# Patient Record
Sex: FEMALE | Race: WHITE | NOT HISPANIC OR LATINO | URBAN - METROPOLITAN AREA
[De-identification: names, ages, dates, MRNs, and addresses within clinical notes are randomized per-mention and may not be internally consistent; named-entity substitution may affect disease eponyms.]

---

## 2017-09-20 ENCOUNTER — OUTPATIENT (OUTPATIENT)
Dept: OUTPATIENT SERVICES | Facility: HOSPITAL | Age: 27
LOS: 1 days | Discharge: HOME | End: 2017-09-20

## 2017-09-20 DIAGNOSIS — Z11.3 ENCOUNTER FOR SCREENING FOR INFECTIONS WITH A PREDOMINANTLY SEXUAL MODE OF TRANSMISSION: ICD-10-CM

## 2017-09-20 DIAGNOSIS — O99.89 OTHER SPECIFIED DISEASES AND CONDITIONS COMPLICATING PREGNANCY, CHILDBIRTH AND THE PUERPERIUM: ICD-10-CM

## 2017-09-20 DIAGNOSIS — Z01.419 ENCOUNTER FOR GYNECOLOGICAL EXAMINATION (GENERAL) (ROUTINE) WITHOUT ABNORMAL FINDINGS: ICD-10-CM

## 2017-11-21 PROBLEM — Z00.00 ENCOUNTER FOR PREVENTIVE HEALTH EXAMINATION: Status: ACTIVE | Noted: 2017-11-21

## 2019-07-30 ENCOUNTER — OUTPATIENT (OUTPATIENT)
Dept: OUTPATIENT SERVICES | Facility: HOSPITAL | Age: 29
LOS: 1 days | Discharge: HOME | End: 2019-07-30

## 2019-07-30 ENCOUNTER — APPOINTMENT (OUTPATIENT)
Dept: ANTEPARTUM | Facility: CLINIC | Age: 29
End: 2019-07-30
Payer: COMMERCIAL

## 2019-07-30 PROCEDURE — 76805 OB US >/= 14 WKS SNGL FETUS: CPT | Mod: 26

## 2019-08-02 DIAGNOSIS — Z36.89 ENCOUNTER FOR OTHER SPECIFIED ANTENATAL SCREENING: ICD-10-CM

## 2019-08-02 DIAGNOSIS — Z3A.21 21 WEEKS GESTATION OF PREGNANCY: ICD-10-CM

## 2019-12-08 ENCOUNTER — OUTPATIENT (OUTPATIENT)
Dept: OUTPATIENT SERVICES | Facility: HOSPITAL | Age: 29
LOS: 1 days | Discharge: HOME | End: 2019-12-08

## 2019-12-08 VITALS
HEART RATE: 112 BPM | RESPIRATION RATE: 18 BRPM | SYSTOLIC BLOOD PRESSURE: 136 MMHG | TEMPERATURE: 98 F | DIASTOLIC BLOOD PRESSURE: 86 MMHG

## 2019-12-08 VITALS — SYSTOLIC BLOOD PRESSURE: 136 MMHG | DIASTOLIC BLOOD PRESSURE: 86 MMHG | HEART RATE: 112 BPM

## 2019-12-08 NOTE — OB PROVIDER TRIAGE NOTE - NSHPPHYSICALEXAM_GEN_ALL_CORE
Vital Signs Last 24 Hrs  T(C): 36.7 (08 Dec 2019 10:28), Max: 36.7 (08 Dec 2019 10:28)  T(F): 98.1 (08 Dec 2019 10:28), Max: 98.1 (08 Dec 2019 10:28)  HR: 112 (08 Dec 2019 10:28) (112 - 112) Manual bedside pulse 96 bpm  BP: 136/86 (08 Dec 2019 10:28) (136/86 - 136/86)  RR: 18 (08 Dec 2019 10:28) (18 - 18)    Gen: NAD, sitting comfortably  Abd: Gravid, soft, NT, palpable ctx  SVE: 3/80/-2, vtx, intact  EFM: 135/mod/+accels, cat I  [place]: irregular  Sono: BPP 8/8, ceph, post placenta, MVP 4.06cm

## 2019-12-08 NOTE — OB PROVIDER TRIAGE NOTE - FAMILY HISTORY
Mother  Still living? Unknown  Family history of breast cancer in mother, Age at diagnosis: Age Unknown  Family history of ovarian cancer, Age at diagnosis: Age Unknown     Aunt  Still living? Unknown  Family history of breast cancer in mother, Age at diagnosis: Age Unknown

## 2019-12-08 NOTE — OB PROVIDER TRIAGE NOTE - NSHPLABSRESULTS_GEN_ALL_CORE
Labs:  4/30  HBsAg NR  Measles nonimmune   varicella immune  rubella immune  mumps immune  A pos, antibody positive, anti-E, too weak to titrate  GBS bacteruria  lead <1  HIV NR    9/6    Antibody pos, Anti-E and anti-c, too weak to titrate     10/29  RPR NR  HIV NR  antibody pos, anti-c, anti-E, titer 1    11/12  GBS pos    Sono:  5/31: +FH, CRL 13.1w  7/30: post placenta, normal anatomy AGA

## 2019-12-08 NOTE — OB PROVIDER TRIAGE NOTE - HISTORY OF PRESENT ILLNESS
28yo  @40w3d with AASHISH 12/ by LMP  here for contractions for the last 2 days, they were q5m last night, now very irregular, not in pain.  Denies VB, LOF.  Good FM.  Denies any complications with this pregnancy.  GBS bacteruria.

## 2019-12-08 NOTE — OB RN TRIAGE NOTE - NS_VISITREASON1_OBGYN_ALL_OB
----- Message from Cece Cunningham NP sent at 5/19/2019  9:17 AM CDT -----  Please inform patient of negative throat culture  
Left call back msg  
Labor at Term

## 2019-12-08 NOTE — OB PROVIDER TRIAGE NOTE - NSOBPROVIDERNOTE_OBGYN_ALL_OB_FT
30yo  @40w3d, GBS pos, antibody anti-E and anti-C positive, BRCA2 pos, measles nonimmune, BPP 8/8, maternal and fetal wellbeing reassuring, in latent labor  -discharge home, labor precautions discussed  -follow up at scheduled appointment tomorrow  -PO hydration encouraged  -tylenol prn for pain    Dr. Hebert and Dr. Hernandez aware.

## 2019-12-08 NOTE — OB PROVIDER TRIAGE NOTE - NS_OBGYNHISTORY_OBGYN_ALL_OB_FT
Ob:  x3, largest 7-3, G1 induced at 42 weeks for oligohydramnios    Gyn: hx of BRCA2 positive, follows at [facility].  Denies fibroids, cysts, abnormal pap smears, STDs.

## 2019-12-08 NOTE — OB PROVIDER TRIAGE NOTE - ADDITIONAL INSTRUCTIONS
If you notice bright red blood enough to soak a pad, a large gush of fluid or continuous leakage of fluid, or decreased fetal movement please come to labor and delivery.  Fetal kick counts should be monitored, and 5-6 kicks should be felt per hour.  If less, please call your doctor.  Please follow up at your regularly scheduled prenatal appointment.

## 2019-12-08 NOTE — OB RN TRIAGE NOTE - FAMILY HISTORY
Mother  Still living? Unknown  Family history of breast cancer in mother, Age at diagnosis: Age Unknown     Aunt  Still living? Unknown  Family history of breast cancer in mother, Age at diagnosis: Age Unknown

## 2019-12-09 ENCOUNTER — INPATIENT (INPATIENT)
Facility: HOSPITAL | Age: 29
LOS: 1 days | Discharge: HOME | End: 2019-12-11
Attending: OBSTETRICS & GYNECOLOGY | Admitting: OBSTETRICS & GYNECOLOGY
Payer: COMMERCIAL

## 2019-12-09 VITALS
HEIGHT: 67 IN | RESPIRATION RATE: 18 BRPM | WEIGHT: 214.95 LBS | DIASTOLIC BLOOD PRESSURE: 78 MMHG | HEART RATE: 75 BPM | TEMPERATURE: 98 F | SYSTOLIC BLOOD PRESSURE: 133 MMHG

## 2019-12-09 PROBLEM — Z78.9 OTHER SPECIFIED HEALTH STATUS: Chronic | Status: ACTIVE | Noted: 2019-12-08

## 2019-12-09 LAB
ALLERGY+IMMUNOLOGY DIAG STUDY NOTE: SIGNIFICANT CHANGE UP
AMPHET UR-MCNC: NEGATIVE — SIGNIFICANT CHANGE UP
ANTIBODY INTERPRETATION 2: SIGNIFICANT CHANGE UP
APPEARANCE UR: ABNORMAL
BACTERIA # UR AUTO: NEGATIVE — SIGNIFICANT CHANGE UP
BARBITURATES UR SCN-MCNC: NEGATIVE — SIGNIFICANT CHANGE UP
BASOPHILS # BLD AUTO: 0.01 K/UL — SIGNIFICANT CHANGE UP (ref 0–0.2)
BASOPHILS NFR BLD AUTO: 0.1 % — SIGNIFICANT CHANGE UP (ref 0–1)
BENZODIAZ UR-MCNC: NEGATIVE — SIGNIFICANT CHANGE UP
BILIRUB UR-MCNC: NEGATIVE — SIGNIFICANT CHANGE UP
BLD GP AB SCN SERPL QL: SIGNIFICANT CHANGE UP
BUPRENORPHINE SCREEN, URINE RESULT: NEGATIVE — SIGNIFICANT CHANGE UP
COCAINE METAB.OTHER UR-MCNC: NEGATIVE — SIGNIFICANT CHANGE UP
COLOR SPEC: YELLOW — SIGNIFICANT CHANGE UP
DIFF PNL FLD: NEGATIVE — SIGNIFICANT CHANGE UP
DIR ANTIGLOB POLYSPECIFIC INTERPRETATION: SIGNIFICANT CHANGE UP
EOSINOPHIL # BLD AUTO: 0 K/UL — SIGNIFICANT CHANGE UP (ref 0–0.7)
EOSINOPHIL NFR BLD AUTO: 0 % — SIGNIFICANT CHANGE UP (ref 0–8)
EPI CELLS # UR: 4 /HPF — SIGNIFICANT CHANGE UP (ref 0–5)
FENTANYL UR QL: NEGATIVE — SIGNIFICANT CHANGE UP
GLUCOSE UR QL: NEGATIVE — SIGNIFICANT CHANGE UP
HCT VFR BLD CALC: 37.4 % — SIGNIFICANT CHANGE UP (ref 37–47)
HGB BLD-MCNC: 12.7 G/DL — SIGNIFICANT CHANGE UP (ref 12–16)
HYALINE CASTS # UR AUTO: 3 /LPF — SIGNIFICANT CHANGE UP (ref 0–7)
IMM GRANULOCYTES NFR BLD AUTO: 0.3 % — SIGNIFICANT CHANGE UP (ref 0.1–0.3)
KETONES UR-MCNC: NEGATIVE — SIGNIFICANT CHANGE UP
L&D DRUG SCREEN, URINE: SIGNIFICANT CHANGE UP
LEUKOCYTE ESTERASE UR-ACNC: NEGATIVE — SIGNIFICANT CHANGE UP
LYMPHOCYTES # BLD AUTO: 0.93 K/UL — LOW (ref 1.2–3.4)
LYMPHOCYTES # BLD AUTO: 7.9 % — LOW (ref 20.5–51.1)
MCHC RBC-ENTMCNC: 27.9 PG — SIGNIFICANT CHANGE UP (ref 27–31)
MCHC RBC-ENTMCNC: 34 G/DL — SIGNIFICANT CHANGE UP (ref 32–37)
MCV RBC AUTO: 82 FL — SIGNIFICANT CHANGE UP (ref 81–99)
METHADONE UR-MCNC: NEGATIVE — SIGNIFICANT CHANGE UP
MONOCYTES # BLD AUTO: 0.45 K/UL — SIGNIFICANT CHANGE UP (ref 0.1–0.6)
MONOCYTES NFR BLD AUTO: 3.8 % — SIGNIFICANT CHANGE UP (ref 1.7–9.3)
NEUTROPHILS # BLD AUTO: 10.3 K/UL — HIGH (ref 1.4–6.5)
NEUTROPHILS NFR BLD AUTO: 87.9 % — HIGH (ref 42.2–75.2)
NITRITE UR-MCNC: NEGATIVE — SIGNIFICANT CHANGE UP
NRBC # BLD: 0 /100 WBCS — SIGNIFICANT CHANGE UP (ref 0–0)
OPIATES UR-MCNC: NEGATIVE — SIGNIFICANT CHANGE UP
OXYCODONE UR-MCNC: NEGATIVE — SIGNIFICANT CHANGE UP
PCP UR-MCNC: NEGATIVE — SIGNIFICANT CHANGE UP
PH UR: 6.5 — SIGNIFICANT CHANGE UP (ref 5–8)
PLATELET # BLD AUTO: 201 K/UL — SIGNIFICANT CHANGE UP (ref 130–400)
PRENATAL SYPHILIS TEST: SIGNIFICANT CHANGE UP
PROPOXYPHENE QUALITATIVE URINE RESULT: NEGATIVE — SIGNIFICANT CHANGE UP
PROT UR-MCNC: ABNORMAL
RBC # BLD: 4.56 M/UL — SIGNIFICANT CHANGE UP (ref 4.2–5.4)
RBC # FLD: 13.6 % — SIGNIFICANT CHANGE UP (ref 11.5–14.5)
RBC CASTS # UR COMP ASSIST: 9 /HPF — HIGH (ref 0–4)
SP GR SPEC: 1.02 — SIGNIFICANT CHANGE UP (ref 1.01–1.02)
UROBILINOGEN FLD QL: SIGNIFICANT CHANGE UP
WBC # BLD: 11.72 K/UL — HIGH (ref 4.8–10.8)
WBC # FLD AUTO: 11.72 K/UL — HIGH (ref 4.8–10.8)
WBC UR QL: 2 /HPF — SIGNIFICANT CHANGE UP (ref 0–5)

## 2019-12-09 PROCEDURE — 59400 OBSTETRICAL CARE: CPT

## 2019-12-09 RX ORDER — DIBUCAINE 1 %
1 OINTMENT (GRAM) RECTAL EVERY 6 HOURS
Refills: 0 | Status: DISCONTINUED | OUTPATIENT
Start: 2019-12-09 | End: 2019-12-11

## 2019-12-09 RX ORDER — SIMETHICONE 80 MG/1
80 TABLET, CHEWABLE ORAL EVERY 4 HOURS
Refills: 0 | Status: DISCONTINUED | OUTPATIENT
Start: 2019-12-09 | End: 2019-12-11

## 2019-12-09 RX ORDER — IBUPROFEN 200 MG
600 TABLET ORAL EVERY 6 HOURS
Refills: 0 | Status: COMPLETED | OUTPATIENT
Start: 2019-12-09 | End: 2020-11-06

## 2019-12-09 RX ORDER — DEXAMETHASONE 0.5 MG/5ML
4 ELIXIR ORAL EVERY 6 HOURS
Refills: 0 | Status: DISCONTINUED | OUTPATIENT
Start: 2019-12-09 | End: 2019-12-11

## 2019-12-09 RX ORDER — OXYTOCIN 10 UNIT/ML
333.33 VIAL (ML) INJECTION
Qty: 20 | Refills: 0 | Status: DISCONTINUED | OUTPATIENT
Start: 2019-12-09 | End: 2019-12-11

## 2019-12-09 RX ORDER — ACETAMINOPHEN 500 MG
975 TABLET ORAL
Refills: 0 | Status: DISCONTINUED | OUTPATIENT
Start: 2019-12-09 | End: 2019-12-11

## 2019-12-09 RX ORDER — NALOXONE HYDROCHLORIDE 4 MG/.1ML
0.1 SPRAY NASAL
Refills: 0 | Status: DISCONTINUED | OUTPATIENT
Start: 2019-12-09 | End: 2019-12-11

## 2019-12-09 RX ORDER — DIPHENHYDRAMINE HCL 50 MG
25 CAPSULE ORAL EVERY 6 HOURS
Refills: 0 | Status: DISCONTINUED | OUTPATIENT
Start: 2019-12-09 | End: 2019-12-11

## 2019-12-09 RX ORDER — HYDROCORTISONE 1 %
1 OINTMENT (GRAM) TOPICAL EVERY 6 HOURS
Refills: 0 | Status: DISCONTINUED | OUTPATIENT
Start: 2019-12-09 | End: 2019-12-11

## 2019-12-09 RX ORDER — PRAMOXINE HYDROCHLORIDE 150 MG/15G
1 AEROSOL, FOAM RECTAL EVERY 4 HOURS
Refills: 0 | Status: DISCONTINUED | OUTPATIENT
Start: 2019-12-09 | End: 2019-12-11

## 2019-12-09 RX ORDER — SODIUM CHLORIDE 9 MG/ML
1000 INJECTION, SOLUTION INTRAVENOUS
Refills: 0 | Status: DISCONTINUED | OUTPATIENT
Start: 2019-12-09 | End: 2019-12-09

## 2019-12-09 RX ORDER — LANOLIN
1 OINTMENT (GRAM) TOPICAL EVERY 6 HOURS
Refills: 0 | Status: DISCONTINUED | OUTPATIENT
Start: 2019-12-09 | End: 2019-12-11

## 2019-12-09 RX ORDER — MAGNESIUM HYDROXIDE 400 MG/1
30 TABLET, CHEWABLE ORAL
Refills: 0 | Status: DISCONTINUED | OUTPATIENT
Start: 2019-12-09 | End: 2019-12-11

## 2019-12-09 RX ORDER — IBUPROFEN 200 MG
600 TABLET ORAL EVERY 6 HOURS
Refills: 0 | Status: DISCONTINUED | OUTPATIENT
Start: 2019-12-09 | End: 2019-12-11

## 2019-12-09 RX ORDER — GLYCERIN ADULT
1 SUPPOSITORY, RECTAL RECTAL AT BEDTIME
Refills: 0 | Status: DISCONTINUED | OUTPATIENT
Start: 2019-12-09 | End: 2019-12-11

## 2019-12-09 RX ORDER — KETOROLAC TROMETHAMINE 30 MG/ML
30 SYRINGE (ML) INJECTION ONCE
Refills: 0 | Status: DISCONTINUED | OUTPATIENT
Start: 2019-12-09 | End: 2019-12-09

## 2019-12-09 RX ORDER — AMPICILLIN TRIHYDRATE 250 MG
2 CAPSULE ORAL ONCE
Refills: 0 | Status: COMPLETED | OUTPATIENT
Start: 2019-12-09 | End: 2019-12-09

## 2019-12-09 RX ORDER — SODIUM CHLORIDE 9 MG/ML
3 INJECTION INTRAMUSCULAR; INTRAVENOUS; SUBCUTANEOUS EVERY 8 HOURS
Refills: 0 | Status: DISCONTINUED | OUTPATIENT
Start: 2019-12-09 | End: 2019-12-11

## 2019-12-09 RX ORDER — OXYCODONE HYDROCHLORIDE 5 MG/1
5 TABLET ORAL ONCE
Refills: 0 | Status: DISCONTINUED | OUTPATIENT
Start: 2019-12-09 | End: 2019-12-11

## 2019-12-09 RX ORDER — AMPICILLIN TRIHYDRATE 250 MG
1 CAPSULE ORAL EVERY 4 HOURS
Refills: 0 | Status: DISCONTINUED | OUTPATIENT
Start: 2019-12-09 | End: 2019-12-09

## 2019-12-09 RX ORDER — AER TRAVELER 0.5 G/1
1 SOLUTION RECTAL; TOPICAL EVERY 4 HOURS
Refills: 0 | Status: DISCONTINUED | OUTPATIENT
Start: 2019-12-09 | End: 2019-12-11

## 2019-12-09 RX ORDER — OXYCODONE HYDROCHLORIDE 5 MG/1
5 TABLET ORAL
Refills: 0 | Status: DISCONTINUED | OUTPATIENT
Start: 2019-12-09 | End: 2019-12-11

## 2019-12-09 RX ORDER — ONDANSETRON 8 MG/1
4 TABLET, FILM COATED ORAL EVERY 6 HOURS
Refills: 0 | Status: DISCONTINUED | OUTPATIENT
Start: 2019-12-09 | End: 2019-12-11

## 2019-12-09 RX ORDER — BENZOCAINE 10 %
1 GEL (GRAM) MUCOUS MEMBRANE EVERY 6 HOURS
Refills: 0 | Status: DISCONTINUED | OUTPATIENT
Start: 2019-12-09 | End: 2019-12-11

## 2019-12-09 RX ADMIN — SODIUM CHLORIDE 3 MILLILITER(S): 9 INJECTION INTRAMUSCULAR; INTRAVENOUS; SUBCUTANEOUS at 15:02

## 2019-12-09 RX ADMIN — SODIUM CHLORIDE 3 MILLILITER(S): 9 INJECTION INTRAMUSCULAR; INTRAVENOUS; SUBCUTANEOUS at 23:35

## 2019-12-09 RX ADMIN — Medication 975 MILLIGRAM(S): at 15:03

## 2019-12-09 RX ADMIN — Medication 216 GRAM(S): at 09:25

## 2019-12-09 RX ADMIN — Medication 600 MILLIGRAM(S): at 23:34

## 2019-12-09 RX ADMIN — Medication 30 MILLIGRAM(S): at 12:31

## 2019-12-09 RX ADMIN — Medication 600 MILLIGRAM(S): at 19:29

## 2019-12-09 NOTE — PROGRESS NOTE ADULT - ASSESSMENT
A/P:  28yo  @40w4d, GBS pos, antibody anti-E and anti-C positive, BRCA2 pos, measles nonimmune, in labor  -cont EFM/toco  -clear liquid diet, IVF  -pain management with epidural  -continue ampicillin for GBS prophylaxis  -f/u UDS  -MMR postpartum    Dr. Patel and Dr. Hoffman aware. A/P:  28yo  @40w4d, GBS pos, antibody anti-E and anti-C positive, BRCA2 pos, measles nonimmune, in labor, s/p epidural, s/p SROM   -cont EFM/toco  -clear liquid diet, IVF  -pain management with epidural  -continue ampicillin for GBS prophylaxis  -f/u UDS  -MMR postpartum    Dr. Patel and Dr. Hoffman aware.

## 2019-12-09 NOTE — OB RN DELIVERY SUMMARY - NS_SEPSISRSKCALC_OBGYN_ALL_OB_FT
EOS calculated successfully. EOS Risk Factor: 0.5/1000 live births ([facility] national incidence); GA=40w4d; Temp=98.4; ROM=1; GBS='Positive'; Antibiotics='Broad spectrum antibiotics 2-3.9 hrs prior to birth'

## 2019-12-09 NOTE — OB PROVIDER H&P - NS_OBGYNHISTORY_OBGYN_ALL_OB_FT
Ob:  x3, largest 7-3, G1 induced at 42 weeks for oligohydramnios  SAB x 1, no D&C    Gyn: hx of BRCA2 positive, follows at [facility].  Denies fibroids, cysts, abnormal pap smears, STDs.

## 2019-12-09 NOTE — OB PROVIDER H&P - ATTENDING COMMENTS
30yo  @40w4d, GBS pos, antibody anti-E and anti-C positive, BRCA2 pos, measles nonimmune, in labor  -admit  -iv access  -iv abx for gbs prophylaxis   -MMR at d/c   -for epidural analgesia

## 2019-12-09 NOTE — OB PROVIDER DELIVERY SUMMARY - NSPROVIDERDELIVERYNOTE_OBGYN_ALL_OB_FT
Patient was fully dilated and pushing. Fetal head was OP and patient began pushing. Became exhausted. Vacuum placed. Three pulls no pop offs, max pressure 500 mmhg. The head delivered.  The anterior and posterior shoulders delivered, followed by the remaining body atraumatically. Body cord and nuchal arm present. Delayed cord clamping was performed, and then clamped and cut. Cord blood gases collected x2. The  was handed to the mother. The placenta delivered intact with membranes. Pitocin was administered. Uterus massaged, fundus found to be firm. Cervix, vagina and perineum inspected.First degree laceration was noted, repaired using 3-0 chromic in the usual fashion with good hemostasis.     Viable male infant delivered, weighing 3480 gms, with APGARs 9/9    Laceration: first degree laceration  [id number] cc

## 2019-12-09 NOTE — PROCEDURE NOTE - NSSITEPREP_SKIN_A_CORE
Adherence to aseptic technique: hand hygiene prior to donning barriers (gown, gloves), don cap and mask, sterile drape over patient/prep

## 2019-12-09 NOTE — OB PROVIDER H&P - ASSESSMENT
30yo  @40w4d, GBS pos, antibody anti-E and anti-C positive, BRCA2 pos, measles nonimmune, in labor  -admit to L&D  -cont efm/toco  -clear liquid diet, IVF  -monitor vitals per routine  -f/u admission labs  -MMR postpartum  -ampicillin for GBS prophlaxis    Dr. Hebert and Dr. Hoffman aware.

## 2019-12-09 NOTE — OB PROVIDER H&P - NSHPPHYSICALEXAM_GEN_ALL_CORE
ICU Vital Signs Last 24 Hrs  T(C): 36.7 (08 Dec 2019 10:28), Max: 36.7 (08 Dec 2019 10:28)  T(F): 98.1 (08 Dec 2019 10:28), Max: 98.1 (08 Dec 2019 10:28)  HR: 75 (09 Dec 2019 09:25) (75 - 112)  BP: 133/78 (09 Dec 2019 09:25) (133/78 - 136/86)  RR: 18 (08 Dec 2019 10:28) (18 - 18)    Gen: NAD, sitting comfortably  Abd: Gravid, soft, NT, strongly palpable ctx  SVE: 8/100/-2, vtx, intact  EFM: 140/mod/+accels, cat I  [place]: q5m ICU Vital Signs Last 24 Hrs  T(C): 36.7 (08 Dec 2019 10:28), Max: 36.7 (08 Dec 2019 10:28)  T(F): 98.1 (08 Dec 2019 10:28), Max: 98.1 (08 Dec 2019 10:28)  HR: 75 (09 Dec 2019 09:25) (75 - 112)  BP: 133/78 (09 Dec 2019 09:25) (133/78 - 136/86)  RR: 18 (08 Dec 2019 10:28) (18 - 18)    Gen: NAD, sitting comfortably  Abd: Gravid, soft, NT, strongly palpable ctx  SVE: 8/90/-1, vtx, intact  EFM: 140/mod/+accels, cat I  [place]: q5m

## 2019-12-09 NOTE — OB PROVIDER H&P - HISTORY OF PRESENT ILLNESS
28yo  @40w4d with AASHISH 12/5 by LMP  here for contractions for the last 2 days, they were q2-3m last night, desires pain management with epidural.  Reports scant mucous discharge, no robert LOF.  Denies VB. Good FM.  H/o BRCA2 positive, follows at [facility].  Denies any complications with this pregnancy.  GBS bacteruria.

## 2019-12-09 NOTE — PROCEDURE NOTE - NSINFORMCONSENT_GEN_A_CORE
me/Benefits, risks, and possible complications of procedure explained to patient/caregiver who verbalized understanding and gave written consent.

## 2019-12-09 NOTE — PROGRESS NOTE ADULT - SUBJECTIVE AND OBJECTIVE BOX
PGY 1 Note    Patient seen at bedside for evaluation of labor progression.  Comfortable s/p epidural.  Currently pushing. With intermittent variable decels, resolving spontaneously.     EFM: 155/mod/+accel/+intermittent variable decels  TOCO: q2-3m  SVE: 10100/0 @1119    Labs:                        12.7   11.72 )-----------( 201      ( 09 Dec 2019 09:09 )             37.4           ABO RH Interpretation: A POS (19 @ 09:09)  antibody pos, Anti-C and Anti-E, polyspecific interpretation neg    Urinalysis Basic - ( 09 Dec 2019 09:39 )    Color: Yellow / Appearance: Slightly Turbid / S.023 / pH: x  Gluc: x / Ketone: Negative  / Bili: Negative / Urobili: <2 mg/dL   Blood: x / Protein: 30 mg/dL / Nitrite: Negative   Leuk Esterase: Negative / RBC: 9 /HPF / WBC 2 /HPF   Sq Epi: x / Non Sq Epi: 4 /HPF / Bacteria: Negative          Meds: ampicillin  IVPB 1 Gram(s) IV Intermittent every 4 hours, started @0984 PGY 1 Note    Patient seen at bedside for evaluation of labor progression.  Comfortable s/p epidural.  S/p SROM clear @1058. Currently pushing. With intermittent variable decels, resolving spontaneously.     EFM: 155/mod/+accel/+intermittent variable decels  TOCO: q2-3m  SVE: 10/100/0 @1119    Labs:                        12.7   11.72 )-----------( 201      ( 09 Dec 2019 09:09 )             37.4           ABO RH Interpretation: A POS (19 @ 09:09)  antibody pos, Anti-C and Anti-E, polyspecific interpretation neg    Urinalysis Basic - ( 09 Dec 2019 09:39 )    Color: Yellow / Appearance: Slightly Turbid / S.023 / pH: x  Gluc: x / Ketone: Negative  / Bili: Negative / Urobili: <2 mg/dL   Blood: x / Protein: 30 mg/dL / Nitrite: Negative   Leuk Esterase: Negative / RBC: 9 /HPF / WBC 2 /HPF   Sq Epi: x / Non Sq Epi: 4 /HPF / Bacteria: Negative          Meds: ampicillin  IVPB 1 Gram(s) IV Intermittent every 4 hours, started @0925  epidural @0992

## 2019-12-10 LAB
HCT VFR BLD CALC: 32.5 % — LOW (ref 37–47)
HGB BLD-MCNC: 10.4 G/DL — LOW (ref 12–16)
MCHC RBC-ENTMCNC: 27.1 PG — SIGNIFICANT CHANGE UP (ref 27–31)
MCHC RBC-ENTMCNC: 32 G/DL — SIGNIFICANT CHANGE UP (ref 32–37)
MCV RBC AUTO: 84.6 FL — SIGNIFICANT CHANGE UP (ref 81–99)
NRBC # BLD: 0 /100 WBCS — SIGNIFICANT CHANGE UP (ref 0–0)
PLATELET # BLD AUTO: 168 K/UL — SIGNIFICANT CHANGE UP (ref 130–400)
RBC # BLD: 3.84 M/UL — LOW (ref 4.2–5.4)
RBC # FLD: 13.7 % — SIGNIFICANT CHANGE UP (ref 11.5–14.5)
WBC # BLD: 7.26 K/UL — SIGNIFICANT CHANGE UP (ref 4.8–10.8)
WBC # FLD AUTO: 7.26 K/UL — SIGNIFICANT CHANGE UP (ref 4.8–10.8)

## 2019-12-10 PROCEDURE — 86077 PHYS BLOOD BANK SERV XMATCH: CPT

## 2019-12-10 RX ADMIN — Medication 600 MILLIGRAM(S): at 05:55

## 2019-12-10 RX ADMIN — SODIUM CHLORIDE 3 MILLILITER(S): 9 INJECTION INTRAMUSCULAR; INTRAVENOUS; SUBCUTANEOUS at 05:55

## 2019-12-10 RX ADMIN — Medication 0.5 MILLILITER(S): at 16:59

## 2019-12-10 RX ADMIN — Medication 600 MILLIGRAM(S): at 11:03

## 2019-12-10 RX ADMIN — Medication 1 TABLET(S): at 11:03

## 2019-12-10 RX ADMIN — Medication 600 MILLIGRAM(S): at 18:22

## 2019-12-10 RX ADMIN — Medication 975 MILLIGRAM(S): at 20:22

## 2019-12-10 NOTE — PROGRESS NOTE ADULT - SUBJECTIVE AND OBJECTIVE BOX
OB attending  PPD #1    Pt doing well, pain well controlled. No overnight events, no acute complaints.    Ambulating: Yes  Voiding: Yes  Flatus: Yes  Bowel movements: Yes   Breast or bottle feeding: Breastfeeding  Diet: Regular    PAST MEDICAL & SURGICAL HISTORY:  No pertinent past medical history  No significant past surgical history      Physical Exam  Vital Signs Last 24 Hrs  T(C): 35.6 (10 Dec 2019 08:15), Max: 36.3 (09 Dec 2019 23:27)  T(F): 96.1 (10 Dec 2019 08:15), Max: 97.4 (09 Dec 2019 23:27)  HR: 66 (10 Dec 2019 08:15) (66 - 84)  BP: 120/54 (10 Dec 2019 08:15) (120/54 - 120/57)  BP(mean): --  RR: 18 (10 Dec 2019 08:15) (18 - 18)  SpO2: --  Gen: AAOx3, NAD  Abd: Soft, nontender, nondistended, BS+  Fundus: Firm, below umbilicus  Lochia: normal  Ext: No calf tenderness, no swelling    Labs:                        10.4   7.26  )-----------( 168      ( 10 Dec 2019 07:15 )             32.5     rh+    A/P:  s/p vaccuum assisted , PPD #1, doing well  - continue current management  -d/c in [place]   -MMR at d/c

## 2019-12-11 ENCOUNTER — TRANSCRIPTION ENCOUNTER (OUTPATIENT)
Age: 29
End: 2019-12-11

## 2019-12-11 VITALS
TEMPERATURE: 97 F | SYSTOLIC BLOOD PRESSURE: 122 MMHG | HEART RATE: 86 BPM | RESPIRATION RATE: 18 BRPM | DIASTOLIC BLOOD PRESSURE: 86 MMHG

## 2019-12-11 RX ORDER — ACETAMINOPHEN 500 MG
3 TABLET ORAL
Qty: 0 | Refills: 0 | DISCHARGE
Start: 2019-12-11

## 2019-12-11 RX ORDER — SIMETHICONE 80 MG/1
1 TABLET, CHEWABLE ORAL
Qty: 0 | Refills: 0 | DISCHARGE
Start: 2019-12-11

## 2019-12-11 RX ORDER — IBUPROFEN 200 MG
1 TABLET ORAL
Qty: 0 | Refills: 0 | DISCHARGE
Start: 2019-12-11

## 2019-12-11 RX ADMIN — Medication 600 MILLIGRAM(S): at 06:24

## 2019-12-11 RX ADMIN — Medication 975 MILLIGRAM(S): at 09:23

## 2019-12-11 RX ADMIN — Medication 600 MILLIGRAM(S): at 11:52

## 2019-12-11 RX ADMIN — Medication 1 TABLET(S): at 11:52

## 2019-12-11 RX ADMIN — Medication 600 MILLIGRAM(S): at 01:00

## 2019-12-11 NOTE — DISCHARGE NOTE OB - HOSPITAL COURSE
12-11-19 @ 05:17    HPI:  30yo  @40w4d with AASHISH 12/5 by LMP  here for contractions for the last 2 days, they were q2-3m last night, desires pain management with epidural.  Reports scant mucous discharge, no robert LOF.  Denies VB. Good FM.  H/o BRCA2 positive, follows at [facility].  Denies any complications with this pregnancy.  GBS bacteruria. (09 Dec 2019 09:10)      PAST MEDICAL & SURGICAL HISTORY:  No pertinent past medical history  No significant past surgical history      POST PARTUM COURSE:   uncomplicated labor and postpartum course discharged PPD2       LABS:                        10.4   7.26  )-----------( 168      ( 10 Dec 2019 07:15 )             32.5                         12.7   11.72 )-----------( 201      ( 09 Dec 2019 09:09 )             37.4                   Allergies    No Known Allergies    Intolerances

## 2019-12-11 NOTE — DISCHARGE NOTE OB - PATIENT PORTAL LINK FT
You can access the FollowMyHealth Patient Portal offered by [facility] by registering at the following website: [email or web address]. By joining [employer]’s FollowMyHealth portal, you will also be able to view your health information using other applications (apps) compatible with our system.

## 2019-12-11 NOTE — DISCHARGE NOTE OB - CARE PROVIDER_API CALL
Cheko Hernandez)  Obstetrics and Gynecology  [address] [place] [address]  Phone: [phone]  Fax: [phone]  Follow Up Time:

## 2019-12-11 NOTE — DISCHARGE NOTE OB - MEDICATION SUMMARY - MEDICATIONS TO TAKE
I will START or STAY ON the medications listed below when I get home from the hospital:    acetaminophen 325 mg oral tablet  -- 3 tab(s) by mouth , As Needed  -- Indication: For pain    ibuprofen 600 mg oral tablet  -- 1 tab(s) by mouth every 6 hours, As Needed  -- Indication: For pain    simethicone 80 mg oral tablet, chewable  -- 1 tab(s) by mouth every 4 hours, As needed, Gas  -- Indication: For gas

## 2019-12-11 NOTE — PROGRESS NOTE ADULT - SUBJECTIVE AND OBJECTIVE BOX
OB attending  PPD #2    Pt doing well, pain well controlled. No overnight events, no acute complaints.    Ambulating: Yes  Voiding: Yes  Flatus: Yes  Bowel movements: Yes   Breast or bottle feeding: Breastfeeding  Diet: Regular    PAST MEDICAL & SURGICAL HISTORY:  No pertinent past medical history  No significant past surgical history      Physical Exam  Vital Signs Last 24 Hrs  T(C): 36.1 (11 Dec 2019 08:34), Max: 36.3 (10 Dec 2019 23:15)  T(F): 97 (11 Dec 2019 08:34), Max: 97.3 (10 Dec 2019 23:15)  HR: 86 (11 Dec 2019 08:34) (81 - 93)  BP: 122/86 (11 Dec 2019 08:34) (108/63 - 136/83)  BP(mean): --  RR: 18 (11 Dec 2019 08:34) (18 - 18)  SpO2: --  Gen: AAOx3, NAD  Abd: Soft, nontender, nondistended, BS+  Fundus: Firm, below umbilicus  Lochia: normal  Ext: No calf tenderness, no swelling    Labs:                        10.4   7.26  )-----------( 168      ( 10 Dec 2019 07:15 )             32.5         A/P:  s/p , PPD #2, doing well  - continue current management  d/c home

## 2019-12-14 DIAGNOSIS — O48.0 POST-TERM PREGNANCY: ICD-10-CM

## 2019-12-14 DIAGNOSIS — Z23 ENCOUNTER FOR IMMUNIZATION: ICD-10-CM

## 2019-12-14 DIAGNOSIS — Z3A.40 40 WEEKS GESTATION OF PREGNANCY: ICD-10-CM

## 2019-12-18 NOTE — OB PROVIDER H&P - NS ED NOTE AC HIGH RISK COUNTRIES
No Richie Clemente)  Gastroenterology; Internal Medicine  [address] [place] [address]  Phone: [phone]  Fax: [phone]  Follow Up Time: 4-6 Days
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
## 2021-05-26 NOTE — OB PROVIDER TRIAGE NOTE - TERM DELIVERIES, OB PROFILE
Chief complaint:   Chief Complaint   Patient presents with   • Establish Care       Vitals:  Visit Vitals  /62 (BP Location: RUE - Right upper extremity, Patient Position: Sitting, Cuff Size: Regular)   Pulse 70   Ht 5' 2\" (1.575 m)   Wt 56.4 kg   LMP 08/01/2016   BMI 22.73 kg/m²       HISTORY OF PRESENT ILLNESS     HPI  Pt is a 53 yo F with PMH hyperthyroidism s/p iodine ablation now with hypothyroidism came to clinic to establish care.     She is currently taking thyroid NP 120mg daily and last TSH was    TSH (mcUnits/mL)   Date Value   07/09/2019 0.012 (L)   No recent dose adjustments. Admits to taking medications daily as prescribed.  Denies changes in weight, changes in appetite, changes in bowel habits, cold intolerance, changes in sleep, skin changes or hair changes, SOB, hoarseness, neck pain, tremor or palpitation.      Other significant problems:  Patient Active Problem List    Diagnosis Date Noted   • Rectal polyp 04/24/2018     Priority: Low     Recommend repeat colonoscopy in 2023     • Astigmatism of both eyes with presbyopia 02/08/2017     Priority: Low   • Unspecified hypothyroidism 02/24/2014     Priority: Low   • VZV (varicella-zoster virus) infection 12/30/2013     Priority: Low       PAST MEDICAL, FAMILY AND SOCIAL HISTORY     Medications:  Current Outpatient Medications   Medication   • thyroid (NP Thyroid) 120 MG tablet   • spironolactone (ALDACTONE) 50 MG tablet   • progesterone 225mg compounded hormone capsule   • tazarotene (TAZORAC) 0.05 % cream   • zolpidem (Ambien) 10 MG tablet     No current facility-administered medications for this visit.       Allergies:  ALLERGIES:  No Known Allergies    Past Medical  History/Surgeries:  Past Medical History:   Diagnosis Date   • Endometriosis    • Hyperthyroidism 1994    s/p iodine ablation   • Hypothyroid     s/p ablation   • Infertility, female     Had successful IVF with twins boys 2003       Past Surgical History:   Procedure Laterality  Date   • Colonoscopy  04/24/2018       • Colonoscopy w/ polypectomy  05/18/2021    repeat 5-Dr Og   • Diagnostic laparoscopy surgical excision endometriosis of the rectum  1999       Family History:  Family History   Problem Relation Age of Onset   • Cancer Maternal Grandmother         Colon   • Heart Maternal Grandmother         Heart Attack   • Hypertension Father    • Other Mother         Polycystic kidney Disease   • Kidney Transplant Mother    • Other Sister         Polycystic kidney Disease   • Liver Disease Sister        Social History:  Social History     Tobacco Use   • Smoking status: Never Smoker   • Smokeless tobacco: Never Used   Substance Use Topics   • Alcohol use: Yes     Alcohol/week: 2.5 - 3.3 standard drinks     Types: 3 - 4 Standard drinks or equivalent per week       REVIEW OF SYSTEMS     Review of Systems  All other systems are reviewed and are negative except as documented in the HPI (History of Present Illness)     PHYSICAL EXAM     Physical Exam  Visit Vitals  /62 (BP Location: RUE - Right upper extremity, Patient Position: Sitting, Cuff Size: Regular)   Pulse 70   Ht 5' 2\" (1.575 m)   Wt 56.4 kg   LMP 08/01/2016   BMI 22.73 kg/m²     Constitutional: She appears well-developed and well-nourished.   Head: Normocephalic and atraumatic.   Eyes: EOM (extraocular muscles) are normal. Right eye exhibits no discharge. Left eye exhibits no discharge. No scleral icterus.   Neck: Normal range of motion. Neck supple. No thyromegaly, no thyroid mass or nodule noted.  Cardiovascular: Normal rate, regular rhythm and normal heart sounds. No murmur heard.  Pulmonary/Chest: Effort normal and breath sounds normal. No stridor. No respiratory distress. She has no wheezes. She has no rales.   Abdominal: Soft. Bowel sounds are normal. There is no tenderness.   Musculoskeletal: Normal range of motion in bilateral upper extremity and lower extremity. She exhibits no edema.   Neurological: She is  alert and oriented to person, place, and time.   Skin: Skin is warm. No rash noted.   Psychiatric: She has a normal mood and affect. Her behavior is normal.     ASSESSMENT/PLAN     Love was seen today for establish care.    Diagnoses and all orders for this visit:    Decreased hearing of both ears  -     SERVICE TO AUDIOLOGY  -     SERVICE TO ENT    Postablative hypothyroidism  -     THYROID STIMULATING HORMONE REFLEX; Future  -     FREE T4; Future  -     FREE T3; Future    At risk for side effect of medication  -     COMPREHENSIVE METABOLIC PANEL; Future      Return in about 3 months (around 8/26/2021) for Hypothyrodism, f/u, fasting labs now.       3

## 2022-07-26 NOTE — OB RN DELIVERY SUMMARY - NS_REGEMAIL_OBGYN_ALL_OB
Above listed  information was sent to the admitting office Dupixent Pregnancy And Lactation Text: This medication likely crosses the placenta but the risk for the fetus is uncertain. This medication is excreted in breast milk.

## 2023-05-19 ENCOUNTER — APPOINTMENT (OUTPATIENT)
Dept: OBGYN | Facility: CLINIC | Age: 33
End: 2023-05-19
Payer: MEDICAID

## 2023-05-19 VITALS
HEIGHT: 67 IN | BODY MASS INDEX: 31.71 KG/M2 | WEIGHT: 202 LBS | DIASTOLIC BLOOD PRESSURE: 81 MMHG | SYSTOLIC BLOOD PRESSURE: 120 MMHG

## 2023-05-19 DIAGNOSIS — Z01.419 ENCOUNTER FOR GYNECOLOGICAL EXAMINATION (GENERAL) (ROUTINE) W/OUT ABNORMAL FINDINGS: ICD-10-CM

## 2023-05-19 DIAGNOSIS — Z15.09 GENETIC SUSCEPTIBILITY TO MALIGNANT NEOPLASM OF BREAST: ICD-10-CM

## 2023-05-19 DIAGNOSIS — Z15.01 GENETIC SUSCEPTIBILITY TO MALIGNANT NEOPLASM OF BREAST: ICD-10-CM

## 2023-05-19 LAB
BILIRUB UR QL STRIP: NORMAL
GLUCOSE UR-MCNC: NORMAL
HCG UR QL: 0.2 EU/DL
HCG UR QL: NEGATIVE
HGB UR QL STRIP.AUTO: NORMAL
KETONES UR-MCNC: NORMAL
LEUKOCYTE ESTERASE UR QL STRIP: NORMAL
NITRITE UR QL STRIP: NORMAL
PH UR STRIP: 5.5
PROT UR STRIP-MCNC: NORMAL
SP GR UR STRIP: 1.02

## 2023-05-19 PROCEDURE — 81003 URINALYSIS AUTO W/O SCOPE: CPT | Mod: QW

## 2023-05-19 PROCEDURE — 81025 URINE PREGNANCY TEST: CPT

## 2023-05-19 PROCEDURE — 99395 PREV VISIT EST AGE 18-39: CPT

## 2023-05-19 NOTE — PLAN
[FreeTextEntry1] : Annual exam \par gc chl sent from the office\par cont Cryselle\par follow up at [facility] q 6 for brca 2 pos carier\par n/v 1 yr prn \par

## 2023-05-19 NOTE — HISTORY OF PRESENT ILLNESS
[FreeTextEntry1] : Pt comes in for annual exam\par no complains \par on Cryselle\par no menmet reg cycles \par no sob, no cp, full rom no dysuria \par \par mother with hx of brca pos, breast ca at age 38 \par aunt breast ca, and and mother with ovarian ca \par pt follows up in [facility] every 6 months\par

## 2023-05-19 NOTE — COUNSELING
[Nutrition/ Exercise/ Weight Management] : nutrition, exercise, weight management [Breast Self Exam] : breast self exam [Bladder Hygiene] : bladder hygiene [Pregnancy Options] : pregnancy options [Lab Results] : lab results [FreeTextEntry2] : BRCA follow up

## 2023-05-19 NOTE — PHYSICAL EXAM
[Chaperone Present] : A chaperone was present in the examining room during all aspects of the physical examination [FreeTextEntry1] : Nichole [Appropriately responsive] : appropriately responsive [Alert] : alert [No Acute Distress] : no acute distress [No Murmurs] : no murmurs [Soft] : soft [Non-tender] : non-tender [Non-distended] : non-distended [No HSM] : No HSM [No Lesions] : no lesions [No Mass] : no mass [Oriented x3] : oriented x3 [Examination Of The Breasts] : a normal appearance [No Masses] : no breast masses were palpable [Labia Majora] : normal [Labia Minora] : normal [Normal] : normal [Uterine Adnexae] : normal

## 2023-05-24 LAB
C TRACH RRNA SPEC QL NAA+PROBE: NOT DETECTED
N GONORRHOEA RRNA SPEC QL NAA+PROBE: NOT DETECTED
SOURCE AMPLIFICATION: NORMAL

## 2023-06-06 DIAGNOSIS — Z78.9 OTHER SPECIFIED HEALTH STATUS: ICD-10-CM

## 2023-06-06 RX ORDER — NORGESTREL AND ETHINYL ESTRADIOL 0.3-0.03MG
0.3-3 KIT ORAL
Qty: 3 | Refills: 3 | Status: ACTIVE | COMMUNITY
Start: 2023-06-06 | End: 1900-01-01

## 2024-02-21 ENCOUNTER — RX RENEWAL (OUTPATIENT)
Age: 34
End: 2024-02-21

## 2024-02-21 RX ORDER — NORGESTREL AND ETHINYL ESTRADIOL 0.3-0.03MG
0.3-3 KIT ORAL
Qty: 1 | Refills: 2 | Status: ACTIVE | COMMUNITY
Start: 2023-05-19 | End: 1900-01-01
